# Patient Record
Sex: MALE | Race: WHITE | NOT HISPANIC OR LATINO | URBAN - METROPOLITAN AREA
[De-identification: names, ages, dates, MRNs, and addresses within clinical notes are randomized per-mention and may not be internally consistent; named-entity substitution may affect disease eponyms.]

---

## 2024-09-30 ENCOUNTER — APPOINTMENT (OUTPATIENT)
Dept: URGENT CARE | Facility: CLINIC | Age: 59
End: 2024-09-30

## 2025-03-12 ENCOUNTER — OFFICE VISIT (OUTPATIENT)
Age: 60
End: 2025-03-12
Payer: COMMERCIAL

## 2025-03-12 VITALS
WEIGHT: 165 LBS | DIASTOLIC BLOOD PRESSURE: 86 MMHG | RESPIRATION RATE: 16 BRPM | HEIGHT: 66 IN | BODY MASS INDEX: 26.52 KG/M2 | HEART RATE: 76 BPM | SYSTOLIC BLOOD PRESSURE: 124 MMHG | OXYGEN SATURATION: 98 %

## 2025-03-12 DIAGNOSIS — Z11.4 SCREENING FOR HIV (HUMAN IMMUNODEFICIENCY VIRUS): ICD-10-CM

## 2025-03-12 DIAGNOSIS — Z12.5 SCREENING FOR PROSTATE CANCER: ICD-10-CM

## 2025-03-12 DIAGNOSIS — Z12.11 SCREENING FOR COLON CANCER: ICD-10-CM

## 2025-03-12 DIAGNOSIS — F51.01 PRIMARY INSOMNIA: ICD-10-CM

## 2025-03-12 DIAGNOSIS — Z00.00 WELL ADULT EXAM: Primary | ICD-10-CM

## 2025-03-12 DIAGNOSIS — Z11.59 NEED FOR HEPATITIS C SCREENING TEST: ICD-10-CM

## 2025-03-12 PROCEDURE — 99386 PREV VISIT NEW AGE 40-64: CPT | Performed by: INTERNAL MEDICINE

## 2025-03-12 PROCEDURE — 99204 OFFICE O/P NEW MOD 45 MIN: CPT | Performed by: INTERNAL MEDICINE

## 2025-03-12 NOTE — PROGRESS NOTES
Name: Tian Farah      : 1965      MRN: [id number]  Encounter Provider: Uli Fitch MD  Encounter Date: 3/12/2025   Encounter department: [facility] INTERNAL MEDICINE [address]  :  Assessment & Plan  Screening for HIV (human immunodeficiency virus)    Orders:    HIV 1/2 AG/AB w Reflex [facility] for 2 yr old and above; Future    Need for hepatitis C screening test    Orders:    Hepatitis C Antibody; Future    Well adult exam  Due for Shingrix vaccine  - Look into your last tetanus  - Referral for screening colonoscopy  Orders:    Ambulatory Referral to Gastroenterology; Future    Basic metabolic panel; Future    CBC and differential; Future    Lipid panel; Future    Hemoglobin A1C; Future    Hepatic function panel; Future    TSH, 3rd generation with Free T4 reflex; Future    PSA, Total Screen; Future    Primary insomnia  Sleeps better w/  benadryl       Screening for colon cancer    Orders:    Ambulatory Referral to Gastroenterology; Future    Screening for prostate cancer    Orders:    PSA, Total Screen; Future           History of Present Illness   Establish Primary Care, former [facility] patient  Goes by [employer]  Never , no kids, lives alone  GF in [place], 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  Day trips  Enjoys travel, music, restaurants  No regular exercise.          Review of Systems   Constitutional:  Negative for appetite change, chills, diaphoresis, fatigue, fever and unexpected weight change.   HENT:  Negative for congestion, hearing loss and rhinorrhea.    Eyes:  Negative for visual disturbance.   Respiratory:  Negative for cough, chest tightness, shortness of breath and wheezing.    Cardiovascular:  Negative for chest pain, palpitations and leg swelling.   Gastrointestinal:  Negative for abdominal pain and blood in stool.   Endocrine: Negative for cold intolerance, heat intolerance, polydipsia and polyuria.   Genitourinary:  Negative for difficulty urinating, dysuria,  "frequency and urgency.   Musculoskeletal:  Negative for arthralgias and myalgias.   Skin:  Negative for rash.   Neurological:  Negative for dizziness, weakness, light-headedness and headaches.   Hematological:  Does not bruise/bleed easily.   Psychiatric/Behavioral:  Negative for dysphoric mood and sleep disturbance.        Objective   /86 (BP Location: Left arm)   Pulse 76   Resp 16   Ht 5' 6\" (1.676 m)   Wt 74.8 kg (165 lb)   SpO2 98%   BMI 26.63 kg/m²      Physical Exam  Constitutional:       Appearance: He is well-developed.   HENT:      Head: Normocephalic and atraumatic.      Nose: Nose normal.   Eyes:      General: No scleral icterus.     Conjunctiva/sclera: Conjunctivae normal.      Pupils: Pupils are equal, round, and reactive to light.   Neck:      Thyroid: No thyromegaly.      Vascular: No JVD.      Trachea: No tracheal deviation.   Cardiovascular:      Rate and Rhythm: Normal rate and regular rhythm.      Heart sounds: No murmur heard.     No friction rub. No gallop.   Pulmonary:      Effort: Pulmonary effort is normal. No respiratory distress.      Breath sounds: Normal breath sounds. No wheezing or rales.   Abdominal:      General: Bowel sounds are normal. There is no distension.      Palpations: Abdomen is soft. There is no mass.      Tenderness: There is no abdominal tenderness. There is no guarding or rebound.   Musculoskeletal:         General: No tenderness.      Cervical back: Normal range of motion and neck supple.   Lymphadenopathy:      Cervical: No cervical adenopathy.   Skin:     General: Skin is warm and dry.      Findings: No erythema or rash.   Neurological:      Mental Status: He is alert and oriented to person, place, and time.      Cranial Nerves: No cranial nerve deficit.   Psychiatric:         Behavior: Behavior normal.         Thought Content: Thought content normal.         Judgment: Judgment normal.         "

## 2025-03-18 ENCOUNTER — OFFICE VISIT (OUTPATIENT)
Age: 60
End: 2025-03-18
Payer: COMMERCIAL

## 2025-03-18 VITALS
SYSTOLIC BLOOD PRESSURE: 122 MMHG | BODY MASS INDEX: 26.52 KG/M2 | OXYGEN SATURATION: 100 % | WEIGHT: 165 LBS | HEIGHT: 66 IN | HEART RATE: 76 BPM | DIASTOLIC BLOOD PRESSURE: 78 MMHG

## 2025-03-18 DIAGNOSIS — L03.012 PARONYCHIA OF THUMB, LEFT: Primary | ICD-10-CM

## 2025-03-18 PROCEDURE — 99213 OFFICE O/P EST LOW 20 MIN: CPT

## 2025-03-18 RX ORDER — DOXYCYCLINE HYCLATE 100 MG
100 TABLET ORAL 2 TIMES DAILY
Qty: 10 TABLET | Refills: 0 | Status: SHIPPED | OUTPATIENT
Start: 2025-03-18 | End: 2025-03-23

## 2025-03-18 NOTE — PROGRESS NOTES
"Name: Tian Farah      : 1965      MRN: [id number]  Encounter Provider: Muna Prieto PA-C  Encounter Date: 3/18/2025   Encounter department: [facility] INTERNAL MEDICINE [facility] [address]  :  Assessment & Plan  Paronychia of thumb, left  Due to history of penicillin allergy, will treat with doxycycline twice daily for 5 days for suspected paronychia.  Recommended continuing warm water soaks at least 20 minutes 3 times daily.  Avoid squeezing or using anything to attempt to drain it at this time.  Notify the office of any fevers, chills, or worsening redness or pain.              History of Present Illness   Patient is a 60 yo male that presents today for left thumb pain. He was waxing his car 1 week ago and aggravated a cut.  He tried to apply topical Neosporin and a Band-Aid with no relief.  The pain started to worsen, so he tried hydrogen peroxide and water.  It is now warm to touch, throbbing, red, and swollen.  He tried to drain it last night with a needle, but it did not provide relief.  He denies any fevers or chills today.    Hand Pain       Review of Systems   Constitutional:  Negative for chills and fever.   Musculoskeletal:  Positive for arthralgias.   Skin:  Positive for color change.       Objective   /78   Pulse 76   Ht 5' 6\" (1.676 m)   Wt 74.8 kg (165 lb)   SpO2 100%   BMI 26.63 kg/m²      Physical Exam  Vitals and nursing note reviewed.   Constitutional:       General: He is awake.      Appearance: Normal appearance. He is well-developed, well-groomed and overweight.   HENT:      Head: Normocephalic and atraumatic.      Right Ear: Hearing and external ear normal.      Left Ear: Hearing and external ear normal.      Nose: Nose normal.      Mouth/Throat:      Lips: Pink.      Mouth: Mucous membranes are moist.   Eyes:      General: Lids are normal. Vision grossly intact. Gaze aligned appropriately.      Conjunctiva/sclera: Conjunctivae normal.   Neck:      Vascular: No " carotid bruit.      Trachea: Trachea and phonation normal.   Pulmonary:      Effort: Pulmonary effort is normal.   Abdominal:      General: Abdomen is protuberant.   Musculoskeletal:      Cervical back: Neck supple.   Skin:     General: Skin is warm.      Capillary Refill: Capillary refill takes less than 2 seconds.      Comments: Left thumb erythematous, warm to touch, and edematous.   Neurological:      Mental Status: He is alert.   Psychiatric:         Attention and Perception: Attention and perception normal.         Mood and Affect: Mood and affect normal.         Speech: Speech normal.         Behavior: Behavior normal. Behavior is cooperative.         Thought Content: Thought content normal.         Cognition and Memory: Cognition and memory normal.         Judgment: Judgment normal.

## 2025-03-20 ENCOUNTER — TELEPHONE (OUTPATIENT)
Age: 60
End: 2025-03-20

## 2025-03-20 NOTE — TELEPHONE ENCOUNTER
Patient called in to check if Dr. Fitch  had any upcoming appoints for next week. He had been seen in the office for a thumb infection but stated it's not getting better. He will call back next week.

## 2025-05-06 ENCOUNTER — TELEPHONE (OUTPATIENT)
Age: 60
End: 2025-05-06

## 2025-05-06 NOTE — TELEPHONE ENCOUNTER
A referral has been received for this patient to have him scheduled for a colonoscopy screening with Gastroenterology. However, after reviewing the chart, there is a recall listed from Dr. Taylor in [facility] for a colonoscopy due in 2027 for a 10-year repeat. The procedure information is not listed on the chart. Can you please confirm when the patient is due with Dr. Taylor and provide an update on this encounter so we know if we can close out the GI referral or if we should be contacting the patient to schedule?

## 2025-05-06 NOTE — TELEPHONE ENCOUNTER
A referral has been received for this patient to have him scheduled for a colonoscopy screening with Gastroenterology; however, after reviewing the chart, there is a recall listed from Dr. Taylor in [facility] for a colonoscopy due in 2027 for a 10-year repeat. The procedure information is not listed on the chart. I sent a message to [facility] under the Colorectal context to check into when the recall date is and update the encounter so we know if we can reach out to the patient to schedule the procedure or if we should be closing out the GI referral.

## 2025-05-14 ENCOUNTER — TELEPHONE (OUTPATIENT)
Age: 60
End: 2025-05-14

## 2025-05-14 NOTE — TELEPHONE ENCOUNTER
Patient just wanted Dr. Fitch to know that he hasn't been able to do the labs he requested due to unforeseen circumstances.  He was apologetic but will be doing them soon.  Please let him know.  Thank you.

## 2025-06-02 ENCOUNTER — OFFICE VISIT (OUTPATIENT)
Age: 60
End: 2025-06-02
Payer: COMMERCIAL

## 2025-06-02 VITALS
HEART RATE: 78 BPM | SYSTOLIC BLOOD PRESSURE: 126 MMHG | HEIGHT: 66 IN | WEIGHT: 165 LBS | DIASTOLIC BLOOD PRESSURE: 78 MMHG | OXYGEN SATURATION: 100 % | BODY MASS INDEX: 26.52 KG/M2

## 2025-06-02 DIAGNOSIS — J34.89 NOSTRIL INFECTION: Primary | ICD-10-CM

## 2025-06-02 PROBLEM — E78.2 MIXED HYPERLIPIDEMIA: Status: ACTIVE | Noted: 2020-07-07

## 2025-06-02 PROCEDURE — 99213 OFFICE O/P EST LOW 20 MIN: CPT

## 2025-06-02 RX ORDER — DOXYCYCLINE HYCLATE 100 MG
100 TABLET ORAL 2 TIMES DAILY
Qty: 14 TABLET | Refills: 0 | Status: SHIPPED | OUTPATIENT
Start: 2025-06-02 | End: 2025-06-09

## 2025-06-02 NOTE — LETTER
June 2, 2025     Patient: Tian Farah  YOB: 1965  Date of Visit: 6/2/2025      To Whom it May Concern:    Tian Farah is under my professional care. Tian was seen in my office on 6/2/2025. Please excuse him today, 6/2/25. Tian may return to work on 6/5/25.    If you have any questions or concerns, please don't hesitate to call.         Sincerely,          Muna Prieto PA-C        CC: No Recipients

## 2025-06-02 NOTE — PROGRESS NOTES
"Name: Tian Farah      : 1965      MRN: [id number]  Encounter Provider: Muna Prieto PA-C  Encounter Date: 2025   Encounter department: [facility] [address] [facility] [address]  :  Assessment & Plan  Nostril infection  Recommended starting doxycyline 100 mg BID x 7 days for nostril infection.  Reviewed potential adverse effects including increased risk of sunburn.  Take with food and probiotic.  Continue to take ibuprofen and/or Tylenol as needed for pain and apply ice to the area.  ED protocols discussed.  Orders:    doxycycline hyclate (VIBRA-TABS) 100 mg tablet; Take 1 tablet (100 mg total) by mouth 2 (two) times a day for 7 days           History of Present Illness   Patient is a 58 yo male that presents today for L nostril pain and swelling that started this morning.  He has a history of bilateral nasal sores that he picks and scab over.  Initially a few days ago there was some purulent drainage, but has since stopped.  He denies any fevers, chills, dysphagia, shortness of breath, changes in vision.  He admits to nasal pain and left-sided sinus pain.  He has been taking ibuprofen with some relief.      Review of Systems   Constitutional:  Negative for chills and fever.   HENT:  Positive for sinus pain. Negative for dental problem, rhinorrhea, sore throat and trouble swallowing.    Respiratory:  Negative for cough and shortness of breath.        Objective   /78   Pulse 78   Ht 5' 6\" (1.676 m)   Wt 74.8 kg (165 lb)   SpO2 100%   BMI 26.63 kg/m²      Physical Exam  Vitals and nursing note reviewed.   Constitutional:       General: He is awake.      Appearance: Normal appearance. He is well-developed, well-groomed and overweight.   HENT:      Head: Normocephalic and atraumatic.      Right Ear: Hearing and external ear normal.      Left Ear: Hearing and external ear normal.      Nose: Nose normal.      Comments: Bilateral scabbed nasal sores. Erythema, edema, and warmth to " touch of left nostril. Left sinuses are tender to palpation and edematous.      Mouth/Throat:      Lips: Pink.      Mouth: Mucous membranes are moist.     Eyes:      General: Lids are normal. Vision grossly intact. Gaze aligned appropriately.      Conjunctiva/sclera: Conjunctivae normal.     Neck:      Vascular: No carotid bruit.      Trachea: Trachea and phonation normal.   Pulmonary:      Effort: Pulmonary effort is normal.   Abdominal:      General: Abdomen is protuberant.     Musculoskeletal:      Cervical back: Neck supple.     Skin:     General: Skin is warm.      Capillary Refill: Capillary refill takes less than 2 seconds.     Neurological:      Mental Status: He is alert.     Psychiatric:         Attention and Perception: Attention and perception normal.         Mood and Affect: Mood and affect normal.         Speech: Speech normal.         Behavior: Behavior normal. Behavior is cooperative.         Thought Content: Thought content normal.         Cognition and Memory: Cognition and memory normal.         Judgment: Judgment normal.

## 2025-06-03 ENCOUNTER — NURSE TRIAGE (OUTPATIENT)
Age: 60
End: 2025-06-03

## 2025-06-03 ENCOUNTER — OFFICE VISIT (OUTPATIENT)
Age: 60
End: 2025-06-03
Payer: COMMERCIAL

## 2025-06-03 VITALS
DIASTOLIC BLOOD PRESSURE: 88 MMHG | WEIGHT: 165 LBS | TEMPERATURE: 98 F | OXYGEN SATURATION: 98 % | HEIGHT: 66 IN | RESPIRATION RATE: 18 BRPM | HEART RATE: 108 BPM | SYSTOLIC BLOOD PRESSURE: 128 MMHG | BODY MASS INDEX: 26.52 KG/M2

## 2025-06-03 DIAGNOSIS — J34.89 NOSTRIL INFECTION: Primary | ICD-10-CM

## 2025-06-03 DIAGNOSIS — F40.240 CLAUSTROPHOBIA: ICD-10-CM

## 2025-06-03 PROCEDURE — 99213 OFFICE O/P EST LOW 20 MIN: CPT

## 2025-06-03 RX ORDER — LORAZEPAM 0.5 MG/1
0.5 TABLET ORAL ONCE
Qty: 1 TABLET | Refills: 0 | Status: SHIPPED | OUTPATIENT
Start: 2025-06-03 | End: 2025-06-03

## 2025-06-03 NOTE — TELEPHONE ENCOUNTER
"REASON FOR CONVERSATION: Eye Swelling (SEEN/TREATED-6/2)    SYMPTOMS: Patient was seen/treated for nostril infection and bumps. Patient reports the swelling has got under the eye. Pt denies any vision loss or no swelling effecting his sight or redness/discharge at this time.     OTHER HEALTH INFORMATION: Patient was prescribed an abx: doxy for his nostril infection.     PROTOCOL DISPOSITION: See Today in Office    CARE ADVICE PROVIDED: Triage nurse made patient an appt and advised patient to go to  or call back if anything worsened in the time span till the appt today.    PRACTICE FOLLOW-UP: none        Reason for Disposition   MODERATE to SEVERE eyelid swelling on one side  (Exception: Due to a mosquito bite.)    Answer Assessment - Initial Assessment Questions  1. ONSET: \"When did the swelling start?\" (e.g., minutes, hours, days)      Eye bags-yesterday   2. LOCATION: \"What part of the eyelid is swollen?\"      Directly under eye   3. SEVERITY: \"How swollen is it?\" (e.g., describe; mild, moderate, or severe)      Moderate   4. ITCHING: \"Is there any itching?\" If Yes, ask: \"How much?\"   (Scale 1-10; mild, moderate or severe)      denies  5. PAIN: \"Is the swelling painful to touch?\" If Yes, ask: \"How painful is it?\"   (Scale 1-10; mild, moderate or severe)      denies  6. FEVER: \"Do you have a fever?\" If Yes, ask: \"What is it, how was it measured, and when did it start?\"       denies  7. CAUSE: \"What do you think is causing the swelling?\"      Nostril issues  8. RECURRENT SYMPTOM: \"Have you had eyelid swelling before?\" If Yes, ask: \"When was the last time?\" \"What happened that time?\"      denies  9. OTHER SYMPTOMS: \"Do you have any other symptoms?\" (e.g., blurred vision, eye discharge, rash, runny nose)      denies  10. PREGNANCY: \"Is there any chance you are pregnant?\" \"When was your last menstrual period?\"     N/a    Protocols used: Eyelid Swelling-Adult-[place]    "

## 2025-06-03 NOTE — PROGRESS NOTES
"Name: Tian Farah      : 1965      MRN: [id number]  Encounter Provider: Josseline Gonsalez PA-C  Encounter Date: 6/3/2025   Encounter department: [facility] [facility] [address]  :  Assessment & Plan  Nostril infection  Patient is a 59-year-old male presenting for acute visit.  Was seen yesterday for suspected nostril infection.  States the swelling around his left eye/cheek is a bit worse.  See attached media.  Denies pain with EOMs, visual disturbances, pressure behind the eye.  Denies new purulent discharge from either nostril.  Slight tenderness to palpation over left cheek.  Will get CT to rule out orbital cellulitis although have greater suspicion for superficial infection versus deep.  Already on doxycycline.  Orders:  •  CT Orbits w contrast; Future           History of Present Illness   Patient is a 59 year old male presenting for office follow up  Recently started on doxycycline for suspected nasal/nostril infection  -Swelling has gotten a little worse just below his left eyelid  -denies any pain with EOMs, vision changes, tenderness to palpation, eye pressure     Review of Systems   Eyes:  Positive for redness. Negative for photophobia, discharge, itching and visual disturbance.       Objective   /88 (BP Location: Left arm, Patient Position: Sitting, Cuff Size: Standard)   Pulse (!) 108   Temp 98 °F (36.7 °C) (Temporal)   Resp 18   Ht 5' 6\" (1.676 m)   Wt 74.8 kg (165 lb)   SpO2 98%   BMI 26.63 kg/m²      Physical Exam  Constitutional:       Appearance: Normal appearance.   HENT:      Nose: Laceration, mucosal edema, congestion and rhinorrhea present.      Left Nostril: Occlusion present.      Right Turbinates: Enlarged and swollen.      Left Turbinates: Enlarged and swollen.      Right Sinus: No maxillary sinus tenderness or frontal sinus tenderness.      Left Sinus: Maxillary sinus tenderness present. No frontal sinus tenderness.     Neurological:      Mental " Status: He is alert.

## 2025-06-04 ENCOUNTER — HOSPITAL ENCOUNTER (OUTPATIENT)
Dept: CT IMAGING | Facility: CLINIC | Age: 60
Discharge: HOME | End: 2025-06-04
Payer: COMMERCIAL

## 2025-06-04 DIAGNOSIS — J34.89 NOSTRIL INFECTION: ICD-10-CM

## 2025-06-04 PROCEDURE — 70481 CT ORBIT/EAR/FOSSA W/DYE: CPT

## 2025-06-04 RX ADMIN — IOHEXOL 100 ML: 350 INJECTION, SOLUTION INTRAVENOUS at 14:21

## 2025-06-05 ENCOUNTER — RESULTS FOLLOW-UP (OUTPATIENT)
Age: 60
End: 2025-06-05

## 2025-06-05 NOTE — TELEPHONE ENCOUNTER
----- Message from Josseline Gonsalez PA-C sent at 6/5/2025  7:38 AM EDT -----  Jonathan Overton,  The CT demonstrates cellulitis but it does not extend into your eye cavity. Cellulitis is a skin infection and doxycycline covers the bugs that typical cause this. Continue to take the antibiotics as   directed. How are your symptoms?  ----- Message -----  From: Interface, Radiology Results In  Sent: 6/4/2025   3:40 PM EDT  To: Josseline Gonsalez PA-C

## 2025-06-05 NOTE — TELEPHONE ENCOUNTER
Patient called back read verbatim what provider put. Patient did state that day by day he feels like he is getting better. Patient is planning on returning back to work tomorrow

## 2025-08-15 ENCOUNTER — TELEPHONE (OUTPATIENT)
Age: 60
End: 2025-08-15